# Patient Record
Sex: MALE | Race: OTHER | URBAN - METROPOLITAN AREA
[De-identification: names, ages, dates, MRNs, and addresses within clinical notes are randomized per-mention and may not be internally consistent; named-entity substitution may affect disease eponyms.]

---

## 2022-10-20 NOTE — PROGRESS NOTES
HPI: Shahriar Reardon (: 1995) is a 24 y.o. male, patient, here for evaluation of the following chief complaint(s): Patient presents with complaint of left hand and wrist pain. He is active  and was running and his view was impeded by other runners and a pole came up and his hand hit the pole. He immediately had pain and swelling. [facility] where he had x-rays of the left hand which revealed a comminuted intra-articular fracture of the second metacarpal base with depression. He also has a small nondisplaced fracture of the dorsal third metacarpal base. He underwent a CT scan of the left hand for further evaluation. He was placed in a radial gutter ortho glass splint and referred to us. Pain is managed. He has a prescription for tramadol and finds that ibuprofen helps. No other complaints or concerns. X-rays and CT of the left hand imported into PACS. Hand Pain (Injured left hand running into pole on 10/18/22. Right hand dominant  [employer].)       Vitals:  Ht 5' 9\" (1.753 m)   Wt 160 lb (72.6 kg)   BMI 23.63 kg/m²    Body mass index is 23.63 kg/m². No Known Allergies    Current Outpatient Medications   Medication Sig    traMADoL (ULTRAM) 50 mg tablet     ibuprofen (MOTRIN) 800 mg tablet      No current facility-administered medications for this visit. History reviewed. No pertinent past medical history. History reviewed. No pertinent surgical history. History reviewed. No pertinent family history. Social History     Tobacco Use    Smoking status: Former     Types: Cigarettes    Smokeless tobacco: Never   Substance Use Topics    Alcohol use: Not Currently    Drug use: Never        Review of Systems    Constitutional: No fevers, chills, night sweats, excessive fatigue or weight loss. Musculoskeletal: + Left hand and wrist pain. Neurologic: No headache, blurred vision, and no areas of focal weakness or numbness. Normal gait.  No sensory problems. Respiratory: No dyspnea on exertion, orthopnea, chest pain, cough or hemoptysis. Cardiovascular: No anginal chest pain, irregular heart beat, tachycardia, palpitations or orthopnea  Integumentary: No chronic rashes, inflammation, ulcerations, pruritus, petechiae, purpura, ecchymoses, or skin changes       Physical Exam    General: Alert, cooperative, no distress  Musculosketal: Left hand - Patient is immobilized in a radial gutter splint. Good perfusion to the fingers. Neurologic:  CNII-XII intact, Normal strength, sensation, and reflexes throughout    Imaging: CT of the left hand  Comminuted intra-articular fracture of the second metacarpal base with depression. Small nondisplaced fracture of the dorsal third metacarpal base. ASSESSMENT/PLAN:  Below is the assessment and plan developed based on review of pertinent history, physical exam, labs, studies, and medications. Left hand and wrist pain. He is active  and was running and his view was impeded by other runners and a pole came up and his hand hit the pole. He immediately had pain and swelling. He presented to the [facility] physician where he had x-rays of the left hand which revealed a comminuted intra-articular fracture of the second metacarpal base with depression. He also has a small nondisplaced fracture of the dorsal third metacarpal base. He underwent a CT scan of the left hand for further evaluation. He was placed in a radial gutter ortho glass splint and referred to us. Pain is managed. He is a good candidate for an ORIF of the second metacarpal. I reviewed risks that include but are not limited to stiffness, pain, nerve or tendon damage and overall incomplete relief of pain. He is scheduled for 10/25/22. He will follow up 7-10 days after the surgery.      1. Nondisplaced fracture of base of second metacarpal bone, left hand, initial encounter for closed fracture  -     REFERRAL TO SURGERY  2. Left hand pain    Return in about 2 weeks (around 11/4/2022). Dr. Kylie Jones was available for immediate consult during this encounter. An electronic signature was used to authenticate this note.   -- WINSTON CosbyC

## 2022-10-21 ENCOUNTER — OFFICE VISIT (OUTPATIENT)
Dept: ORTHOPEDIC SURGERY | Age: 27
End: 2022-10-21
Payer: OTHER GOVERNMENT

## 2022-10-21 VITALS — BODY MASS INDEX: 23.7 KG/M2 | WEIGHT: 160 LBS | HEIGHT: 69 IN

## 2022-10-21 DIAGNOSIS — M79.642 LEFT HAND PAIN: ICD-10-CM

## 2022-10-21 DIAGNOSIS — S62.341A NONDISPLACED FRACTURE OF BASE OF SECOND METACARPAL BONE, LEFT HAND, INITIAL ENCOUNTER FOR CLOSED FRACTURE: Primary | ICD-10-CM

## 2022-10-21 PROCEDURE — 99204 OFFICE O/P NEW MOD 45 MIN: CPT | Performed by: PHYSICIAN ASSISTANT

## 2022-10-21 RX ORDER — IBUPROFEN 800 MG/1
TABLET ORAL
COMMUNITY
Start: 2022-10-20

## 2022-10-21 RX ORDER — TRAMADOL HYDROCHLORIDE 50 MG/1
TABLET ORAL
COMMUNITY
Start: 2022-10-18

## 2022-10-27 DIAGNOSIS — S62.341A NONDISPLACED FRACTURE OF BASE OF SECOND METACARPAL BONE, LEFT HAND, INITIAL ENCOUNTER FOR CLOSED FRACTURE: Primary | ICD-10-CM

## 2022-10-27 RX ORDER — HYDROCODONE BITARTRATE AND ACETAMINOPHEN 5; 325 MG/1; MG/1
1 TABLET ORAL
Qty: 15 TABLET | Refills: 0 | Status: SHIPPED | OUTPATIENT
Start: 2022-10-27 | End: 2022-11-03

## 2022-11-07 NOTE — PROGRESS NOTES
HPI: Jayshree Rainey (: 1995) is a 24 y.o. male, patient, here for evaluation of the following chief complaint(s): Patient presents with complaint of left hand and wrist pain. He is active  and was running and his view was impeded by other runners and a pole came up and his hand hit the pole. He immediately had pain and swelling. He presented to the [facility] physician where he had x-rays of the left hand which revealed a comminuted intra-articular fracture of the second metacarpal base with depression. He also has a small nondisplaced fracture of the dorsal third metacarpal base. He underwent a CT scan of the left hand for further evaluation. He was placed in a radial gutter ortho glass splint and referred to us. He underwent ORIF of the left second metacarpal fracture with dorsal plate and screw fixation on 10/28/2022. Surgical Follow-up (ORIF left hand 2nd metacarpal base fracture on 10/25/22.)       Vitals: There were no vitals taken for this visit. There is no height or weight on file to calculate BMI. No Known Allergies    Current Outpatient Medications   Medication Sig    traMADoL (ULTRAM) 50 mg tablet     ibuprofen (MOTRIN) 800 mg tablet      No current facility-administered medications for this visit. History reviewed. No pertinent past medical history. History reviewed. No pertinent surgical history. History reviewed. No pertinent family history. Social History     Tobacco Use    Smoking status: Former     Types: Cigarettes    Smokeless tobacco: Never   Substance Use Topics    Alcohol use: Not Currently    Drug use: Never        Review of Systems   All other systems reviewed and are negative. Constitutional: No fevers, chills, night sweats, excessive fatigue or weight loss. Musculoskeletal: + Left hand and wrist pain. Neurologic: No headache, blurred vision, and no areas of focal weakness or numbness. Normal gait. No sensory problems.   Respiratory: No dyspnea on exertion, orthopnea, chest pain, cough or hemoptysis. Cardiovascular: No anginal chest pain, irregular heart beat, tachycardia, palpitations or orthopnea  Integumentary: No chronic rashes, inflammation, ulcerations, pruritus, petechiae, purpura, ecchymoses, or skin changes       Physical Exam    Dorsal hand wound is healing well with absorbing sutures and Steri-Strips. No redness drainage or sign infection. Imaging: CT of the left hand  Comminuted intra-articular fracture of the second metacarpal base with depression. Small nondisplaced fracture of the dorsal third metacarpal base. XR Results (most recent):  Results from Appointment encounter on 11/08/22    XR HAND LT MIN 3 V    Narrative  AP, lateral and oblique x-ray of the left hand shows dorsal plate and screw fixation of the left index metacarpal base fracture in good position and alignment. ASSESSMENT/PLAN:  Below is the assessment and plan developed based on review of pertinent history, physical exam, labs, studies, and medications. Left hand and wrist pain. He is active  and was running and his view was impeded by other runners and a pole came up and his hand hit the pole. He immediately had pain and swelling. He presented to the [facility] physician where he had x-rays of the left hand which revealed a comminuted intra-articular fracture of the second metacarpal base with depression. He also has a small nondisplaced fracture of the dorsal third metacarpal base. He underwent a CT scan of the left hand for further evaluation. He was placed in a radial gutter ortho glass splint and referred to us. Pain is managed. He is a good candidate for an ORIF of the second metacarpal.      He underwent ORIF of the left second metacarpal base fracture with dorsal plate and screw fixation on 10/20/2022. Continue with wound care, gentle motion and strength. We spoke about a splint versus cast for comfort and support. He prefers the splint.   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 I asked and return in 3 weeks to check his progress clinically and radiographically. 1. Closed fracture of base of second metacarpal bone of left hand with routine healing, subsequent encounter  -     XR HAND LT MIN 3 V; Future  -     REFERRAL TO [facility] [place]  -     WRIST COCK-UP NON-MOLDED  2. Left hand pain    Return in about 3 weeks (around 11/29/2022).

## 2022-11-08 ENCOUNTER — OFFICE VISIT (OUTPATIENT)
Dept: ORTHOPEDIC SURGERY | Age: 27
End: 2022-11-08
Payer: OTHER GOVERNMENT

## 2022-11-08 DIAGNOSIS — S62.311D: Primary | ICD-10-CM

## 2022-11-08 DIAGNOSIS — M79.642 LEFT HAND PAIN: ICD-10-CM

## 2022-11-08 PROCEDURE — L3908 WHO COCK-UP NONMOLDE PRE OTS: HCPCS | Performed by: ORTHOPAEDIC SURGERY

## 2022-11-08 PROCEDURE — 99024 POSTOP FOLLOW-UP VISIT: CPT | Performed by: ORTHOPAEDIC SURGERY
